# Patient Record
Sex: MALE | Race: BLACK OR AFRICAN AMERICAN | URBAN - METROPOLITAN AREA
[De-identification: names, ages, dates, MRNs, and addresses within clinical notes are randomized per-mention and may not be internally consistent; named-entity substitution may affect disease eponyms.]

---
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
## 2024-04-14 ENCOUNTER — APPOINTMENT (OUTPATIENT)
Dept: GENERAL RADIOLOGY | Age: 24
End: 2024-04-14
Payer: COMMERCIAL

## 2024-04-14 ENCOUNTER — HOSPITAL ENCOUNTER (EMERGENCY)
Age: 24
Discharge: HOME | End: 2024-04-14
Attending: EMERGENCY MEDICINE
Payer: COMMERCIAL

## 2024-04-14 VITALS
DIASTOLIC BLOOD PRESSURE: 78 MMHG | BODY MASS INDEX: 26.71 KG/M2 | HEART RATE: 58 BPM | OXYGEN SATURATION: 99 % | TEMPERATURE: 98.1 F | WEIGHT: 197.2 LBS | SYSTOLIC BLOOD PRESSURE: 131 MMHG | RESPIRATION RATE: 17 BRPM | HEIGHT: 72 IN

## 2024-04-14 DIAGNOSIS — S43.015A ANTERIOR DISLOCATION OF LEFT SHOULDER, INITIAL ENCOUNTER: Primary | ICD-10-CM

## 2024-04-14 DIAGNOSIS — Z75.8 DOES NOT HAVE PRIMARY CARE PROVIDER: ICD-10-CM
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
## 2024-04-14 PROCEDURE — 99284 EMERGENCY DEPT VISIT MOD MDM: CPT

## 2024-04-14 PROCEDURE — 94761 N-INVAS EAR/PLS OXIMETRY MLT: CPT

## 2024-04-14 PROCEDURE — 2700000000 HC OXYGEN THERAPY PER DAY

## 2024-04-14 PROCEDURE — 23655 CLTX SHO DSLC W/MNPJ W/ANES: CPT

## 2024-04-14 PROCEDURE — 73030 X-RAY EXAM OF SHOULDER: CPT

## 2024-04-14 PROCEDURE — 6360000002 HC RX W HCPCS: Performed by: PHYSICIAN ASSISTANT

## 2024-04-14 PROCEDURE — 96374 THER/PROPH/DIAG INJ IV PUSH: CPT

## 2024-04-14 RX ORDER — HYDROCODONE BITARTRATE AND ACETAMINOPHEN 5; 325 MG/1; MG/1
1 TABLET ORAL EVERY 8 HOURS PRN
Qty: 6 TABLET | Refills: 0 | Status: SHIPPED | OUTPATIENT
Start: 2024-04-14 | End: 2024-04-16

## 2024-04-14 RX ORDER — FENTANYL CITRATE 50 UG/ML
50 INJECTION, SOLUTION INTRAMUSCULAR; INTRAVENOUS ONCE
Status: COMPLETED | OUTPATIENT
Start: 2024-04-14 | End: 2024-04-14

## 2024-04-14 RX ORDER — LIDOCAINE HYDROCHLORIDE AND EPINEPHRINE 10; 10 MG/ML; UG/ML
20 INJECTION, SOLUTION INFILTRATION; PERINEURAL ONCE
Status: DISCONTINUED | OUTPATIENT
Start: 2024-04-14 | End: 2024-04-14 | Stop reason: HOSPADM

## 2024-04-14 RX ADMIN — FENTANYL CITRATE 50 MCG: 50 INJECTION INTRAMUSCULAR; INTRAVENOUS at 18:40

## 2024-04-14 ASSESSMENT — PAIN SCALES - GENERAL
PAINLEVEL_OUTOF10: 5
PAINLEVEL_OUTOF10: 6

## 2024-04-14 ASSESSMENT — PAIN DESCRIPTION - ORIENTATION: ORIENTATION: LEFT

## 2024-04-14 ASSESSMENT — PAIN - FUNCTIONAL ASSESSMENT: PAIN_FUNCTIONAL_ASSESSMENT: 0-10

## 2024-04-14 ASSESSMENT — PAIN DESCRIPTION - LOCATION: LOCATION: SHOULDER

## 2024-04-14 NOTE — ED PROVIDER NOTES
[facility] EMERGENCY DEPARTMENT  EMERGENCY DEPARTMENT ENCOUNTER      Pt Name: Asher Llanos  MRN: [id number]  Birthdate 2000  Date of evaluation: 4/14/2024  Provider: Shanel Mark MD  PCP: No primary care provider on file.  Note Started: 5:51 PM EDT 4/14/24    ED Attending Attestation Note     CHIEF COMPLAINT       Chief Complaint   Patient presents with    Dislocation     Pt dislocated shoulder playing basketball, has hx of dislocating shoulder in the past. Ems gave 100 mc of fentanyl      EMERGENCY DEPARTMENT COURSE and DIFFERENTIAL DIAGNOSIS/MDM:      This patient was seen by the advance practice provider.  In addition to the WILLIAN I personally saw Asher Llanos and made/approved the management plan. I take responsibility for the patient management.     Briefly, this is a 23 y.o. male who presents to the emergency department secondary concern for left shoulder dislocation.  It occurred while he was playing bass well.  He has dislocated the shoulder twice before.  He has also dislocated his right shoulder multiple times and has had surgery on it.  However he has not seen orthopedics in a while and has never seen him status post left shoulder dislocations.    On arrival he is awake, alert, oriented.  Sensation is intact distally in the fingertips, cap refill less than 3 seconds, sensation is intact over the deltoid.  He has pain with movement of the left upper extremity particularly at the shoulder.  Physical exam is concerning for dislocation.  X-ray confirms dislocation.    He had a lidocaine injection done at the site followed by reduction by abduction of the arm and then raising over the head.  Reduction was successful.  I reviewed the x-ray images myself and it was reduced successfully.  He was placed in a sling.  We discussed follow-up with orthopedics and how important this is particularly given his history of multiple dislocations and prior need for surgery to the right shoulder.  We

## 2024-04-14 NOTE — PROGRESS NOTES
Placed pt on ETCO2, and 2 liters oxygen. Tolerated well. Electronically signed by Jb Garnica RCP on 4/14/2024 at 6:42 PM

## 2024-04-14 NOTE — DISCHARGE INSTRUCTIONS
Call to set up an appointment with orthopedics for further evaluation of your symptoms.  He was sent home with pain medication to take as needed.  You do not have to take this.  You can take anti-inflammatories, Tylenol and ibuprofen and alternate between the 2.  Wear your sling and swath until you follow-up.  You can remove this to shower.  But should replace afterwards

## 2024-04-15 ENCOUNTER — TELEPHONE (OUTPATIENT)
Dept: ORTHOPEDIC SURGERY | Age: 24
End: 2024-04-15

## 2024-04-15 NOTE — TELEPHONE ENCOUNTER
[facility] TO [facility] APPT WITH DR. MENJIVAR. MAY [facility] WITH CC WHEN RETURNS CALL.

## 2024-04-15 NOTE — TELEPHONE ENCOUNTER
----- Message from Irving Calabrese MD sent at 4/15/2024  8:48 AM EDT -----  Please call this patient and offer a visit to see us either today or tomorrow.  Thanks  ----- Message -----  From: Shanel Mark MD  Sent: 4/15/2024   7:32 AM EDT  To: Irving Calabrese MD

## 2024-04-15 NOTE — ED PROVIDER NOTES
[facility] EMERGENCY DEPARTMENT  EMERGENCY DEPARTMENT ENCOUNTER        Pt Name: Asher Llanos  MRN: [id number]  Birthdate 2000  Date of evaluation: 4/14/2024  Provider: Demetrice Nevarez PA-C  PCP: No primary care provider on file.  Note Started: 11:48 PM EDT 4/14/24      WILLIAN. I have evaluated this patient.        CHIEF COMPLAINT       Chief Complaint   Patient presents with    Dislocation     Pt dislocated shoulder playing basketball, has hx of dislocating shoulder in the past. Ems gave 100 mc of fentanyl        HISTORY OF PRESENT ILLNESS: 1 or more Elements             Asher Llanos is a 23 y.o. male who presents to the emergency department after a dislocation of his left shoulder that occurred prior to arrival while he was playing basketball.  He states that someone hit his arm in a downward motion which caused it to dislocate.  This is probably the third time that this is occurred.  He is complaining of left shoulder pain. He has not seen an orthopedic doctor for this in the past.  He does endorse previous dislocations of his right arm that required surgery.  He denies any numbness or tingling.      Nursing Notes were all reviewed and agreed with or any disagreements were addressed in the HPI.    REVIEW OF SYSTEMS :      Review of Systems   All other systems reviewed and are negative.      Positives and Pertinent negatives as per HPI.     SURGICAL HISTORY   History reviewed. No pertinent surgical history.    CURRENTMEDICATIONS       Discharge Medication List as of 4/14/2024  7:20 PM          ALLERGIES     Patient has no known allergies.    FAMILYHISTORY     History reviewed. No pertinent family history.     SOCIAL HISTORY       Social History     Tobacco Use    Smoking status: Never    Smokeless tobacco: Never   Vaping Use    Vaping Use: Never used   Substance Use Topics    Alcohol use: Never    Drug use: Never       SCREENINGS        [place] Coma Scale  Eye Opening:

## 2024-09-20 ENCOUNTER — HOSPITAL ENCOUNTER (OUTPATIENT)
Dept: GENERAL RADIOLOGY | Age: 24
Discharge: HOME | End: 2024-09-20
Payer: COMMERCIAL

## 2024-09-20 DIAGNOSIS — R22.32 MASS OF ARM, LEFT: ICD-10-CM

## 2024-09-20 PROCEDURE — 73090 X-RAY EXAM OF FOREARM: CPT
